# Patient Record
Sex: MALE | Race: OTHER | NOT HISPANIC OR LATINO | URBAN - METROPOLITAN AREA
[De-identification: names, ages, dates, MRNs, and addresses within clinical notes are randomized per-mention and may not be internally consistent; named-entity substitution may affect disease eponyms.]

---

## 2017-05-17 ENCOUNTER — EMERGENCY (EMERGENCY)
Facility: HOSPITAL | Age: 62
LOS: 1 days | Discharge: HOME | End: 2017-05-17
Admitting: EMERGENCY MEDICINE
Payer: COMMERCIAL

## 2017-05-17 VITALS
HEART RATE: 89 BPM | SYSTOLIC BLOOD PRESSURE: 155 MMHG | RESPIRATION RATE: 16 BRPM | DIASTOLIC BLOOD PRESSURE: 97 MMHG | OXYGEN SATURATION: 98 % | TEMPERATURE: 98 F

## 2017-05-17 PROCEDURE — 93971 EXTREMITY STUDY: CPT | Mod: 26,LT

## 2017-05-17 PROCEDURE — 99284 EMERGENCY DEPT VISIT MOD MDM: CPT

## 2017-05-17 RX ORDER — IBUPROFEN 200 MG
600 TABLET ORAL ONCE
Qty: 0 | Refills: 0 | Status: COMPLETED | OUTPATIENT
Start: 2017-05-17 | End: 2017-05-17

## 2017-05-17 RX ADMIN — Medication 600 MILLIGRAM(S): at 17:53

## 2017-05-17 RX ADMIN — Medication 600 MILLIGRAM(S): at 18:41

## 2017-05-17 NOTE — ED PROVIDER NOTE - OBJECTIVE STATEMENT
60 y/o male no pmh c/o LLE pain and swelling x5 days. Pt admits to getting a massage in [place] x6 days ago and then developing pain and swelling behind knee and in calf after returning form trip. Pt admits to worsening swelling and pain this week. 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 Pt denies chest pain, sob, palpitations, numbness, tingling, weakness, syncope, fever or chills. 62 y/o male no pmh c/o RLE pain and swelling x5 days. Pt admits to getting a massage in [place] x6 days ago and then developing pain and swelling behind knee and in calf after returning form trip. Pt admits to worsening swelling and pain this week. 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 Pt denies chest pain, sob, palpitations, numbness, tingling, weakness, syncope, fever or chills.

## 2017-05-17 NOTE — ED ADULT TRIAGE NOTE - CHIEF COMPLAINT QUOTE
pt c/o right knee pain and swelling  x 6 days. pt sent for MRI by PMD and sent to ED for a large popliteal cyst. pt amb to triage.

## 2017-05-17 NOTE — ED PROVIDER NOTE - PROGRESS NOTE DETAILS
AIDA Medina:  Pt medically stable for discharge.  Pt to follow up with orthopedics (referral list provided).  ACE applied.  Discussed case with pt's PMD who agrees with outpatient work up.

## 2017-05-17 NOTE — ED PROVIDER NOTE - MEDICAL DECISION MAKING DETAILS
62 y/o male w/ LLE swelling after massage/plane ride from [place]- duplex, ice, motrin, reassess 60 y/o male w/ RLE swelling after massage/plane ride from [place]- duplex, ice, motrin, reassess

## 2025-08-26 ENCOUNTER — NON-APPOINTMENT (OUTPATIENT)
Age: 70
End: 2025-08-26

## 2025-08-28 ENCOUNTER — APPOINTMENT (OUTPATIENT)
Dept: GASTROENTEROLOGY | Facility: CLINIC | Age: 70
End: 2025-08-28
Payer: MEDICARE

## 2025-08-28 VITALS
SYSTOLIC BLOOD PRESSURE: 120 MMHG | HEIGHT: 64 IN | BODY MASS INDEX: 25.95 KG/M2 | HEART RATE: 86 BPM | WEIGHT: 152 LBS | OXYGEN SATURATION: 98 % | DIASTOLIC BLOOD PRESSURE: 80 MMHG | RESPIRATION RATE: 12 BRPM

## 2025-08-28 DIAGNOSIS — Z78.9 OTHER SPECIFIED HEALTH STATUS: ICD-10-CM

## 2025-08-28 DIAGNOSIS — Z12.11 ENCOUNTER FOR SCREENING FOR MALIGNANT NEOPLASM OF COLON: ICD-10-CM

## 2025-08-28 PROBLEM — Z00.00 ENCOUNTER FOR PREVENTIVE HEALTH EXAMINATION: Status: ACTIVE | Noted: 2025-08-28

## 2025-08-28 PROCEDURE — 99203 OFFICE O/P NEW LOW 30 MIN: CPT

## 2025-08-28 RX ORDER — SODIUM SULFATE, POTASSIUM SULFATE AND MAGNESIUM SULFATE 1.6; 3.13; 17.5 G/177ML; G/177ML; G/177ML
17.5-3.13-1.6 SOLUTION ORAL
Qty: 354 | Refills: 0 | Status: ACTIVE | COMMUNITY
Start: 2025-08-28 | End: 1900-01-01

## 2025-09-16 ENCOUNTER — APPOINTMENT (OUTPATIENT)
Dept: GASTROENTEROLOGY | Facility: AMBULATORY SURGERY CENTER | Age: 70
End: 2025-09-16
Payer: MEDICARE

## 2025-09-16 PROCEDURE — 45378 DIAGNOSTIC COLONOSCOPY: CPT
